# Patient Record
(demographics unavailable — no encounter records)

---

## 2017-09-01 NOTE — PDOC4
Operative Note


Operative Note


Date of surgery: 9/1/2017





Preoperative diagnosis: Left shoulder adhesive capsulitis





Postoperative diagnosis: Same





Procedure: Left shoulder manipulation under anesthesia with injection 

glenohumeral joint





Surgeon: Brandi





Anesthesia: Deep sedation provided by anesthesia





Complications: None





Operative indications: Patient is a 56-year-old female with left shoulder 

adhesive capsulitis. I had discussed the natural history of this condition with 

her she is been unresponsive to physical therapy and attempts at home 

stretching. We discussed the possibility of letting it run its course versus 

possible manipulation under anesthesia followed by physical therapy. She wishes 

to proceed with manipulation under anesthesia procedure as a result of her 

continued ongoing severe pain and limitations as well as lack of progress. All 

her questions were answered and consent was obtained.





Operative text: Patient was identified procedure verified after timeout was 

performed and adequate amounts of deep sedation provided by anesthesia: The 

left shoulder was examined under anesthesia and found to have a block to 

further motion at a proximally 90 elevation and lacked terminal 80 of 

external rotation and abduction and terminal 30 of internal rotation. After 

manipulation with the audible and palpable release of tissue full range of 

motion was obtained in all planes with no instability. Under sterile conditions 

a total of 1 mL 80 mg/mm Depo-Medrol and 3 mL of half percent plain lidocaine 

were injected from an anterior approach into the glenohumeral joint. Patient 

was returned recovery room in stable condition having tolerated procedure well











JALEN PETERSON MD Sep 1, 2017 18:26

## 2017-10-17 NOTE — PDOC4
Operative Note


Operative Note


Date of surgery: 10/17/2017





Preoperative diagnosis: Recurrent adhesive capsulitis, SLAP tear, 

acromioclavicular joint pain and impingement





Postoperative diagnosis: Same





Operative procedure: Left shoulder capsular release, biceps tenodesis, distal 

clavicle excision and subacromial decompression





Surgeon: Brandi





Anesthesia: Gen. endotracheal plus interscalene block





Estimated blood loss: 25 mL





Complications: None





Operative indications: Patient is a 57-year-old female with previous adhesive 

capsulitis recurrent posterior previous manipulation and physical therapy. We 

had previously discussed the possibility of arthroscopic treatment as she had 

superior labral injury but opted for a manipulation under anesthesia at the 

time instead. At this time with recurrence we talked about addressing any 

underlying pathology including treatment of the SLAP tear with likely biceps 

tenodesis and decompression distal clavicle excision as needed and arthroscopic 

capsular release. We covered risks benefits postoperative course of the 

procedure and restrictions that would be specific to the procedure performed on 

the biceps. All her questions were answered she wants proceed with surgical 

evaluation and treatment consent having been obtained





Operative text: Patient was identified procedure verified patient placed in the 

supine position on the operating table. After adequate amounts of general 

endotracheal anesthesia and a pre-existing scalene block were obtained to the 

left shoulder she was placed in the decubitus position left side up all point 

prominences were well-padded. After timeout was performed patient procedure 

identified and verified the shoulder was noted to have significant restriction 

to end range of motion specifically she lacked about the terminal 80 of 

external rotation terminal 70 elevation and about 20 internal rotation. The 

left shoulder was then prepped and draped in standard sterile fashion placed in 

the arthroscopic arm graham with a total of 10 pounds of traction and a 

standard posterior portal was established. An anterior portal established using 

spinal needle localization capsular release was carried out with electrocautery 

and the biceps tendon was examined and noted to have very significant 

irritation and some fraying and was involved in a superior labral tear. Biceps 

was released and tagged superior labrum was debrided back to stable tissue 

rotator cuff insertion was otherwise intact including the subscapularis 

glenohumeral joint surfaces were well-preserved. Subacromial space was then 

entered bursectomy was performed to allow visualization and decompression 

carried out to yield a type I acromion using cutting block technique distal 

clavicle was narrowed and arthritic and was excised 1 cm to allow adequate 

joint space. Bony fragments were removed with arthroscopic shaver bursal side 

of the rotator cuff was likewise intact biceps tenodesis was carried out 

through the anterior portal with a 7 mm biceptor [employer] anchor with the 

biceps under anatomic tension. Portals were closed with Monocryl subcutaneously 

sterile dressings were applied she was placed in a sling extubated transferred 

to postop holding in stable condition having tolerated procedure well











JALEN PETERSON MD Oct 17, 2017 11:28

## 2017-10-17 NOTE — DISCH
DISCHARGE INSTRUCTIONS


Condition on Discharge


Condition on Discharge:  Stable





Activity After Discharge


Activity Instructions for Disc:  Other, see below


Other activity instructions:  limit lifting to 5 pounds





Diet after Discharge


Diet after Discharge:  Regular





Wound Incision Care


Wound/Incision Care:  Ice to area for comfort, Change dressing


Other wound/incision instructi:  remove dressings 2 days may then shower





Community/Resources/Services


Services at Discharge:  PT EVALUATE & TREAT (immediate physical therapy for 

active passive range of motion shoulder strengthening as tolerated limit elbow 

flexion to 5 pounds force, start ASAP)





Contacting the DR. after DC


Call your doctor for:  Concerns you may have





Follow-Up


Follow up with:  Brandi 7-10 days











JALEN PETERSON MD Oct 17, 2017 11:17

## 2020-04-15 NOTE — DISCH
DISCHARGE INSTRUCTIONS


Condition on Discharge


Condition on Discharge:  Stable





Activity After Discharge


Activity Instructions for Disc:  Other, see below (immediate aggressive shoulder

motion no restrictions)





Diet after Discharge


Diet after Discharge:  Diabetic No Calorie Level





Wound Incision Care


Wound/Incision Care:  Ice to area for comfort





Contacting the  after DC


Call your doctor for:  Concerns you may have





Follow-Up


Follow up with:  Dr. Paz 2 weeks











JALEN PAZ MD           Apr 15, 2020 08:24

## 2020-04-15 NOTE — PDOC4
Operative Note


Operative Note


Date of surgery: 4/15/2020





Preoperative diagnosis: Adhesive capsulitis right shoulder





Postoperative diagnosis: Same with preoperative range of motion lacking about 60

degrees elevation and abduction 40 degrees terminal external rotation in 

abduction





Operative procedure: Manipulation right shoulder under anesthesia





Surgeon: Brandi





Anesthesia: Deep propofol sedation provided by anesthesia





Complications: None





Injections: Right shoulder injected postmanipulation with 1 cc 80 mg/mL Depo-

Medrol and 2 cc half percent plain Marcaine





Indications: Patient is familiar to me from previous adhesive capsulitis on the 

contralateral shoulder and responded very well to manipulation and subsequent 

physical therapy.  She has been struggling with the right shoulder for several 

months and has failed attempts to stretch anti-inflammatories activity 

modification and wishes to proceed with a manipulation on the right shoulder 

given her previous excellent relief on the left.  All her questions were 

answered consent was obtained





Operative text: Patient was identified procedure verified patient placed in the 

supine position on the recovery room cart and timeout was performed.  After 

patient procedure identified and verified adequate amounts of propofol sedation 

were administered preoperative range of motion was observed to be lacking about 

60 degrees terminal elevation and abduction and the terminal 40 degrees of 

external rotation and abduction.  After audible and palpable release of tissues 

with the manipulation she regained full range of motion without instability.  

Under sterile conditions the right shoulder glenohumeral joint was then injected

with 1 cc 80 mg/mL Depo-Medrol and 2 cc half percent plain Marcaine.  A Band-Aid

was applied and patient tolerated the procedure well and remained in recovery 

room for observation











JALEN PETERSON MD           Apr 15, 2020 08:30

## 2020-12-18 NOTE — DISCH
DISCHARGE INSTRUCTIONS


Condition on Discharge


Condition on Discharge:  Stable





Activity After Discharge


Activity Instructions for Disc:  Other, see below (Immediate aggressive stre

tching for range of motion of right shoulder with no restrictions)





Diet after Discharge


Diet after Discharge:  Diabetic No Calorie Level





Wound Incision Care


Wound/Incision Care:  Ice to area for comfort





Community/Resources/Services


Services at Discharge:  PT EVALUATE & TREAT (Immediate aggressive range of 

motion and would start physical therapy Monday and stretch on your own every day

multiple times a day to continue the motion regained)





Contacting the DROj after DC


Call your doctor for:  Concerns you may have





Follow-Up


Follow up with:  Dr. Paz 1 week











JALEN PAZ MD           Dec 18, 2020 14:20

## 2020-12-18 NOTE — PDOC4
Operative Note


Operative Note


Date of surgery: 12/18/2020





Preoperative diagnosis: Rotator cuff and labral tears and adhesive capsulitis 

right shoulder





Postoperative diagnosis: Same except rotator cuff appeared intact





Operative procedure: Right shoulder arthroscopy extensive labral debridement and

capsular release





Surgeon: Brandi





Anesthesia: General plus scalene block





Estimated blood loss: 10 cc





Complications: None





Operative indications: Pau has right shoulder pain unresponsive to nonoperative

management and clinically has some findings of adhesive capsulitis and on MRI 

also had concerns for rotator cuff tear.  I had gone over with her the possible 

operative treatments of arthroscopy possibility of labral debridement versus 

biceps tenodesis repair of rotator cuff if indicated and evaluation and 

treatment of her adhesive capsulitis depending on the extent.  We had talked 

about the recovery process and the rationale for restrictions if indicated based

on the procedures performed along with the possibility of infection nerve or 

blood vessel damage medical or other anesthetic complications among others all 

her questions were answered she wishes to proceed with surgical evaluation and 

treatment.





Operative text: Patient was identified procedure verified patient placed in the 

supine position on operating table.  After adequate amounts of general anest

hesia were administered plus a pre-existing scalene block patient was placed in 

the decubitus position right side up all bony prominences were well-padded.  In 

the right shoulder was examined under anesthesia and found to have lack of 

approximately 50 degrees of terminal elevation and abduction and external 

rotation.  The right shoulder was then prepped and draped in standard sterile 

fashion placed in the arthroscopic arm graham with a total of 10 pounds of 

traction.  After timeout was performed patient procedure identified and verified

a standard posterior portal was established an anterior portal established using

spinal needle localization and the shoulder joint was systematically examined.  

Glenohumeral cartilage was noted to be in overall good condition she had 

extensive labral tearing and synovitis but no compromise of the biceps anchor.  

Likewise the rotator cuff insertion including the subscapularis supraspinatus 

infraspinatus and a normal bare area of the humerus was noted with no compromise

at its joint surface insertion.  No subluxation of the biceps was noted.  

Extensive debridement of the labrum was carried out with arthroscopic shaver and

bipolar electrocautery and a capsular release was carried out with bipolar wand.

 Subacromial evaluation showed no significant bursal sided rotator cuff 

compromise.  The joint was drained of arthroscopic fluid and the shoulder was 

taken out of traction and manipulated and full range of motion gained in all 

planes with no instability.  Portals were closed with nylon suture sterile 

dressings were applied patient was returned to recovery room in stable condition

having tolerated procedure well











JALEN PETERSON MD           Dec 18, 2020 15:51

## 2024-12-13 NOTE — DISCH
DISCHARGE INSTRUCTIONS


Condition on Discharge


Condition on Discharge:  Stable





Activity After Discharge


Activity Instructions for Disc:  Other, see below


Other activity instructions:  immediate aggressive stretching of left shoulder 

no sling or support





Diet after Discharge


Diet after Discharge:  Regular





Wound Incision Care


Wound/Incision Care:  Ice to area for comfort





Community/Resources/Services


Services at Discharge:  PT EVALUATE & TREAT





Contacting the  after DC


Call your doctor for:  Concerns you may have





Follow-Up


Follow up with:  Brandi 7-10 days











JALEN PETERSON MD Sep 1, 2017 11:20 Family